# Patient Record
Sex: FEMALE | Race: BLACK OR AFRICAN AMERICAN | Employment: UNEMPLOYED | URBAN - METROPOLITAN AREA
[De-identification: names, ages, dates, MRNs, and addresses within clinical notes are randomized per-mention and may not be internally consistent; named-entity substitution may affect disease eponyms.]

---

## 2017-08-24 ENCOUNTER — HOSPITAL ENCOUNTER (INPATIENT)
Age: 27
LOS: 2 days | Discharge: HOME | DRG: 560 | End: 2017-08-26
Attending: OBSTETRICS & GYNECOLOGY | Admitting: OBSTETRICS & GYNECOLOGY
Payer: MEDICAID

## 2017-08-24 ENCOUNTER — ANESTHESIA EVENT (OUTPATIENT)
Dept: LABOR AND DELIVERY | Age: 27
DRG: 560 | End: 2017-08-24
Payer: MEDICAID

## 2017-08-24 ENCOUNTER — ANESTHESIA (OUTPATIENT)
Dept: LABOR AND DELIVERY | Age: 27
DRG: 560 | End: 2017-08-24
Payer: MEDICAID

## 2017-08-24 PROBLEM — Z3A.39 39 WEEKS GESTATION OF PREGNANCY: Status: RESOLVED | Noted: 2017-08-24 | Resolved: 2017-08-24

## 2017-08-24 PROBLEM — Z33.1 IUP (INTRAUTERINE PREGNANCY), INCIDENTAL: Status: RESOLVED | Noted: 2017-08-24 | Resolved: 2017-08-24

## 2017-08-24 PROBLEM — Z3A.39 39 WEEKS GESTATION OF PREGNANCY: Status: ACTIVE | Noted: 2017-08-24

## 2017-08-24 PROBLEM — Z33.1 IUP (INTRAUTERINE PREGNANCY), INCIDENTAL: Status: ACTIVE | Noted: 2017-08-24

## 2017-08-24 PROBLEM — O99.820 GBS (GROUP B STREPTOCOCCUS CARRIER), +RV CULTURE, CURRENTLY PREGNANT: Status: RESOLVED | Noted: 2017-08-24 | Resolved: 2017-08-24

## 2017-08-24 PROBLEM — O99.820 GBS (GROUP B STREPTOCOCCUS CARRIER), +RV CULTURE, CURRENTLY PREGNANT: Status: ACTIVE | Noted: 2017-08-24

## 2017-08-24 LAB
ABO + RH BLD: NORMAL
BASOPHILS # BLD: 0 K/UL (ref 0–0.06)
BASOPHILS NFR BLD: 0 % (ref 0–2)
BLOOD GROUP ANTIBODIES SERPL: NORMAL
DIFFERENTIAL METHOD BLD: ABNORMAL
EOSINOPHIL # BLD: 0.1 K/UL (ref 0–0.4)
EOSINOPHIL NFR BLD: 1 % (ref 0–5)
ERYTHROCYTE [DISTWIDTH] IN BLOOD BY AUTOMATED COUNT: 13.1 % (ref 11.6–14.5)
HCT VFR BLD AUTO: 34.5 % (ref 35–45)
HGB BLD-MCNC: 11.2 G/DL (ref 12–16)
LYMPHOCYTES # BLD: 1.8 K/UL (ref 0.9–3.6)
LYMPHOCYTES NFR BLD: 17 % (ref 21–52)
MCH RBC QN AUTO: 27.7 PG (ref 24–34)
MCHC RBC AUTO-ENTMCNC: 32.5 G/DL (ref 31–37)
MCV RBC AUTO: 85.4 FL (ref 74–97)
MONOCYTES # BLD: 0.9 K/UL (ref 0.05–1.2)
MONOCYTES NFR BLD: 9 % (ref 3–10)
NEUTS SEG # BLD: 8 K/UL (ref 1.8–8)
NEUTS SEG NFR BLD: 73 % (ref 40–73)
PLATELET # BLD AUTO: 195 K/UL (ref 135–420)
PMV BLD AUTO: 10.1 FL (ref 9.2–11.8)
RBC # BLD AUTO: 4.04 M/UL (ref 4.2–5.3)
SPECIMEN EXP DATE BLD: NORMAL
WBC # BLD AUTO: 10.8 K/UL (ref 4.6–13.2)

## 2017-08-24 PROCEDURE — 74011250636 HC RX REV CODE- 250/636

## 2017-08-24 PROCEDURE — 86900 BLOOD TYPING SEROLOGIC ABO: CPT | Performed by: OBSTETRICS & GYNECOLOGY

## 2017-08-24 PROCEDURE — 75410000003 HC RECOV DEL/VAG/CSECN EA 0.5 HR

## 2017-08-24 PROCEDURE — 85025 COMPLETE CBC W/AUTO DIFF WBC: CPT | Performed by: OBSTETRICS & GYNECOLOGY

## 2017-08-24 PROCEDURE — 74011250636 HC RX REV CODE- 250/636: Performed by: ANESTHESIOLOGY

## 2017-08-24 PROCEDURE — 00HU33Z INSERTION OF INFUSION DEVICE INTO SPINAL CANAL, PERCUTANEOUS APPROACH: ICD-10-PCS | Performed by: ANESTHESIOLOGY

## 2017-08-24 PROCEDURE — 76060000078 HC EPIDURAL ANESTHESIA

## 2017-08-24 PROCEDURE — 77030007879 HC KT SPN EPDRL TELE -B: Performed by: ANESTHESIOLOGY

## 2017-08-24 PROCEDURE — 3E033VJ INTRODUCTION OF OTHER HORMONE INTO PERIPHERAL VEIN, PERCUTANEOUS APPROACH: ICD-10-PCS | Performed by: OBSTETRICS & GYNECOLOGY

## 2017-08-24 PROCEDURE — 75410000000 HC DELIVERY VAGINAL/SINGLE

## 2017-08-24 PROCEDURE — 4A0HXCZ MEASUREMENT OF PRODUCTS OF CONCEPTION, CARDIAC RATE, EXTERNAL APPROACH: ICD-10-PCS | Performed by: OBSTETRICS & GYNECOLOGY

## 2017-08-24 PROCEDURE — 3E0R3CZ INTRODUCE REGIONAL ANESTH IN SPINAL CANAL, PERC: ICD-10-PCS | Performed by: ANESTHESIOLOGY

## 2017-08-24 PROCEDURE — 74011250637 HC RX REV CODE- 250/637: Performed by: OBSTETRICS & GYNECOLOGY

## 2017-08-24 PROCEDURE — 74011000250 HC RX REV CODE- 250

## 2017-08-24 PROCEDURE — 36415 COLL VENOUS BLD VENIPUNCTURE: CPT | Performed by: OBSTETRICS & GYNECOLOGY

## 2017-08-24 PROCEDURE — 65270000029 HC RM PRIVATE

## 2017-08-24 PROCEDURE — 74011250636 HC RX REV CODE- 250/636: Performed by: OBSTETRICS & GYNECOLOGY

## 2017-08-24 PROCEDURE — 74011000258 HC RX REV CODE- 258: Performed by: OBSTETRICS & GYNECOLOGY

## 2017-08-24 PROCEDURE — 75410000002 HC LABOR FEE PER 1 HR

## 2017-08-24 RX ORDER — NALOXONE HYDROCHLORIDE 0.4 MG/ML
0.2 INJECTION, SOLUTION INTRAMUSCULAR; INTRAVENOUS; SUBCUTANEOUS AS NEEDED
Status: DISCONTINUED | OUTPATIENT
Start: 2017-08-24 | End: 2017-08-25 | Stop reason: HOSPADM

## 2017-08-24 RX ORDER — SODIUM CHLORIDE 0.9 % (FLUSH) 0.9 %
5-10 SYRINGE (ML) INJECTION AS NEEDED
Status: DISCONTINUED | OUTPATIENT
Start: 2017-08-24 | End: 2017-08-25 | Stop reason: HOSPADM

## 2017-08-24 RX ORDER — LIDOCAINE HYDROCHLORIDE 10 MG/ML
20 INJECTION, SOLUTION EPIDURAL; INFILTRATION; INTRACAUDAL; PERINEURAL AS NEEDED
Status: DISCONTINUED | OUTPATIENT
Start: 2017-08-24 | End: 2017-08-25 | Stop reason: HOSPADM

## 2017-08-24 RX ORDER — FENTANYL CITRATE 50 UG/ML
INJECTION, SOLUTION INTRAMUSCULAR; INTRAVENOUS AS NEEDED
Status: DISCONTINUED | OUTPATIENT
Start: 2017-08-24 | End: 2017-08-24 | Stop reason: HOSPADM

## 2017-08-24 RX ORDER — METHYLERGONOVINE MALEATE 0.2 MG/ML
0.2 INJECTION INTRAVENOUS AS NEEDED
Status: DISCONTINUED | OUTPATIENT
Start: 2017-08-24 | End: 2017-08-25 | Stop reason: HOSPADM

## 2017-08-24 RX ORDER — HYDROMORPHONE HYDROCHLORIDE 1 MG/ML
1 INJECTION, SOLUTION INTRAMUSCULAR; INTRAVENOUS; SUBCUTANEOUS
Status: DISCONTINUED | OUTPATIENT
Start: 2017-08-24 | End: 2017-08-25 | Stop reason: HOSPADM

## 2017-08-24 RX ORDER — FENTANYL/ROPIVACAINE/NS/PF 2MCG/ML-.1
1-15 PLASTIC BAG, INJECTION (ML) EPIDURAL
Status: DISCONTINUED | OUTPATIENT
Start: 2017-08-24 | End: 2017-08-25 | Stop reason: HOSPADM

## 2017-08-24 RX ORDER — FENTANYL CITRATE 50 UG/ML
INJECTION, SOLUTION INTRAMUSCULAR; INTRAVENOUS
Status: DISPENSED
Start: 2017-08-24 | End: 2017-08-25

## 2017-08-24 RX ORDER — MINERAL OIL
30 OIL (ML) ORAL AS NEEDED
Status: DISCONTINUED | OUTPATIENT
Start: 2017-08-24 | End: 2017-08-25 | Stop reason: HOSPADM

## 2017-08-24 RX ORDER — SODIUM CHLORIDE, SODIUM LACTATE, POTASSIUM CHLORIDE, CALCIUM CHLORIDE 600; 310; 30; 20 MG/100ML; MG/100ML; MG/100ML; MG/100ML
125 INJECTION, SOLUTION INTRAVENOUS CONTINUOUS
Status: DISCONTINUED | OUTPATIENT
Start: 2017-08-24 | End: 2017-08-25 | Stop reason: HOSPADM

## 2017-08-24 RX ORDER — BUTORPHANOL TARTRATE 2 MG/ML
2 INJECTION INTRAMUSCULAR; INTRAVENOUS
Status: DISCONTINUED | OUTPATIENT
Start: 2017-08-24 | End: 2017-08-25 | Stop reason: HOSPADM

## 2017-08-24 RX ORDER — TERBUTALINE SULFATE 1 MG/ML
0.25 INJECTION SUBCUTANEOUS
Status: DISCONTINUED | OUTPATIENT
Start: 2017-08-24 | End: 2017-08-25 | Stop reason: HOSPADM

## 2017-08-24 RX ORDER — LANOLIN ALCOHOL/MO/W.PET/CERES
325 CREAM (GRAM) TOPICAL
COMMUNITY
End: 2019-08-26

## 2017-08-24 RX ORDER — ACETAMINOPHEN 325 MG/1
650 TABLET ORAL
Status: DISCONTINUED | OUTPATIENT
Start: 2017-08-24 | End: 2017-08-26 | Stop reason: HOSPADM

## 2017-08-24 RX ORDER — OXYTOCIN IN 5 % DEXTROSE 30/500 ML
.5-2 PLASTIC BAG, INJECTION (ML) INTRAVENOUS
Status: DISCONTINUED | OUTPATIENT
Start: 2017-08-24 | End: 2017-08-26 | Stop reason: HOSPADM

## 2017-08-24 RX ORDER — LANOLIN ALCOHOL/MO/W.PET/CERES
325 CREAM (GRAM) TOPICAL
Status: CANCELLED | OUTPATIENT
Start: 2017-08-25

## 2017-08-24 RX ORDER — FENTANYL/ROPIVACAINE/NS/PF 2MCG/ML-.1
PLASTIC BAG, INJECTION (ML) EPIDURAL
Status: COMPLETED
Start: 2017-08-24 | End: 2017-08-24

## 2017-08-24 RX ORDER — ROPIVACAINE HYDROCHLORIDE 2 MG/ML
INJECTION, SOLUTION EPIDURAL; INFILTRATION; PERINEURAL AS NEEDED
Status: DISCONTINUED | OUTPATIENT
Start: 2017-08-24 | End: 2017-08-24 | Stop reason: HOSPADM

## 2017-08-24 RX ORDER — IBUPROFEN 400 MG/1
800 TABLET ORAL
Status: DISCONTINUED | OUTPATIENT
Start: 2017-08-24 | End: 2017-08-26 | Stop reason: HOSPADM

## 2017-08-24 RX ORDER — ONDANSETRON 2 MG/ML
4 INJECTION INTRAMUSCULAR; INTRAVENOUS
Status: DISCONTINUED | OUTPATIENT
Start: 2017-08-24 | End: 2017-08-25 | Stop reason: HOSPADM

## 2017-08-24 RX ORDER — LIDOCAINE HYDROCHLORIDE AND EPINEPHRINE 15; 5 MG/ML; UG/ML
INJECTION, SOLUTION EPIDURAL AS NEEDED
Status: DISCONTINUED | OUTPATIENT
Start: 2017-08-24 | End: 2017-08-24 | Stop reason: HOSPADM

## 2017-08-24 RX ORDER — NALBUPHINE HYDROCHLORIDE 10 MG/ML
10 INJECTION, SOLUTION INTRAMUSCULAR; INTRAVENOUS; SUBCUTANEOUS
Status: DISCONTINUED | OUTPATIENT
Start: 2017-08-24 | End: 2017-08-25 | Stop reason: HOSPADM

## 2017-08-24 RX ORDER — SODIUM CHLORIDE 0.9 % (FLUSH) 0.9 %
5-10 SYRINGE (ML) INJECTION EVERY 8 HOURS
Status: DISCONTINUED | OUTPATIENT
Start: 2017-08-24 | End: 2017-08-25 | Stop reason: HOSPADM

## 2017-08-24 RX ORDER — OXYTOCIN/RINGER'S LACTATE 20/1000 ML
125 PLASTIC BAG, INJECTION (ML) INTRAVENOUS CONTINUOUS
Status: DISCONTINUED | OUTPATIENT
Start: 2017-08-24 | End: 2017-08-25 | Stop reason: HOSPADM

## 2017-08-24 RX ORDER — FENTANYL CITRATE 50 UG/ML
100 INJECTION, SOLUTION INTRAMUSCULAR; INTRAVENOUS ONCE
Status: DISCONTINUED | OUTPATIENT
Start: 2017-08-24 | End: 2017-08-25 | Stop reason: HOSPADM

## 2017-08-24 RX ORDER — ZOLPIDEM TARTRATE 5 MG/1
5 TABLET ORAL
Status: DISCONTINUED | OUTPATIENT
Start: 2017-08-24 | End: 2017-08-26 | Stop reason: HOSPADM

## 2017-08-24 RX ORDER — LIDOCAINE HYDROCHLORIDE 20 MG/ML
INJECTION, SOLUTION EPIDURAL; INFILTRATION; INTRACAUDAL; PERINEURAL AS NEEDED
Status: DISCONTINUED | OUTPATIENT
Start: 2017-08-24 | End: 2017-08-24 | Stop reason: HOSPADM

## 2017-08-24 RX ORDER — OXYTOCIN/RINGER'S LACTATE 20/1000 ML
500 PLASTIC BAG, INJECTION (ML) INTRAVENOUS ONCE
Status: DISPENSED | OUTPATIENT
Start: 2017-08-24 | End: 2017-08-24

## 2017-08-24 RX ORDER — OXYCODONE AND ACETAMINOPHEN 5; 325 MG/1; MG/1
2 TABLET ORAL
Status: DISCONTINUED | OUTPATIENT
Start: 2017-08-24 | End: 2017-08-26 | Stop reason: HOSPADM

## 2017-08-24 RX ADMIN — IBUPROFEN 800 MG: 400 TABLET, FILM COATED ORAL at 23:22

## 2017-08-24 RX ADMIN — SODIUM CHLORIDE, SODIUM LACTATE, POTASSIUM CHLORIDE, AND CALCIUM CHLORIDE 300 ML: 600; 310; 30; 20 INJECTION, SOLUTION INTRAVENOUS at 19:04

## 2017-08-24 RX ADMIN — Medication 6 MILLI-UNITS/MIN: at 11:56

## 2017-08-24 RX ADMIN — ROPIVACAINE HYDROCHLORIDE 7 ML: 2 INJECTION, SOLUTION EPIDURAL; INFILTRATION; PERINEURAL at 18:27

## 2017-08-24 RX ADMIN — CEFOXITIN SODIUM 1 G: 1 POWDER, FOR SOLUTION INTRAVENOUS at 11:36

## 2017-08-24 RX ADMIN — LIDOCAINE HYDROCHLORIDE 5 ML: 20 INJECTION, SOLUTION EPIDURAL; INFILTRATION; INTRACAUDAL; PERINEURAL at 17:11

## 2017-08-24 RX ADMIN — SODIUM CHLORIDE, SODIUM LACTATE, POTASSIUM CHLORIDE, AND CALCIUM CHLORIDE 125 ML/HR: 600; 310; 30; 20 INJECTION, SOLUTION INTRAVENOUS at 11:21

## 2017-08-24 RX ADMIN — BUTORPHANOL TARTRATE 2 MG: 2 INJECTION, SOLUTION INTRAMUSCULAR; INTRAVENOUS at 16:09

## 2017-08-24 RX ADMIN — CEFOXITIN SODIUM 1 G: 1 POWDER, FOR SOLUTION INTRAVENOUS at 15:42

## 2017-08-24 RX ADMIN — CEFOXITIN SODIUM 1 G: 1 POWDER, FOR SOLUTION INTRAVENOUS at 20:00

## 2017-08-24 RX ADMIN — LIDOCAINE HYDROCHLORIDE AND EPINEPHRINE 2 ML: 15; 5 INJECTION, SOLUTION EPIDURAL at 17:14

## 2017-08-24 RX ADMIN — FENTANYL CITRATE 100 MCG: 50 INJECTION, SOLUTION INTRAMUSCULAR; INTRAVENOUS at 17:14

## 2017-08-24 RX ADMIN — Medication 12 ML/HR: at 17:47

## 2017-08-24 RX ADMIN — Medication 125 ML/HR: at 20:36

## 2017-08-24 RX ADMIN — ONDANSETRON 4 MG: 2 INJECTION INTRAMUSCULAR; INTRAVENOUS at 13:47

## 2017-08-24 RX ADMIN — LIDOCAINE HYDROCHLORIDE AND EPINEPHRINE 3 ML: 15; 5 INJECTION, SOLUTION EPIDURAL at 17:13

## 2017-08-24 RX ADMIN — BUTORPHANOL TARTRATE 2 MG: 2 INJECTION, SOLUTION INTRAMUSCULAR; INTRAVENOUS at 13:54

## 2017-08-24 RX ADMIN — ACETAMINOPHEN 650 MG: 325 TABLET ORAL at 23:22

## 2017-08-24 NOTE — H&P
Ostetrical History and Physical    Subjective:     Date of Admission: 2017    Patient is a 28 y.o.   female admitted with term preg with SPROM, no good labor yet. GBS pos, allergic pen. For Obstetric history, see prenantal.    For Review of Systems, see prenatal    Past Medical History:   Diagnosis Date    Abnormal Papanicolaou smear of cervix     Anemia       No past surgical history on file. Prior to Admission medications    Medication Sig Start Date End Date Taking? Authorizing Provider   PNV No.40-Iron Fum-FA Cmb No.1 27-1 mg tab Take 1 Tab by mouth. Yes Historical Provider   ferrous sulfate (IRON) 325 mg (65 mg iron) tablet Take 325 mg by mouth Daily (before breakfast). Yes Historical Provider     Allergies   Allergen Reactions    Pcn [Penicillins] Hives      Social History   Substance Use Topics    Smoking status: Never Smoker    Smokeless tobacco: Never Used    Alcohol use No      No family history on file. Objective:     Blood pressure 130/77, pulse 82, temperature 98.7 °F (37.1 °C), resp. rate 18, height 5' 5\" (1.651 m), weight 63.5 kg (140 lb), not currently breastfeeding. Temp (24hrs), Av.7 °F (37.1 °C), Min:98.7 °F (37.1 °C), Max:98.7 °F (37.1 °C)                HEENT: No pallor, no jaundice, Thyroid and throat normal  RESPIRATORY: Clear to A & P  CVS: pulse reg, HS normal  ABDOMEN: Gravid. Vertex. EFW=7lb +-1lb. No abnormal tenderness. Pelvic: Cervix 1.5,   Effaced: 50%  Station:  -3  Data Review:   No results found for this or any previous visit (from the past 24 hour(s)).   Monitor:  Reactivity:present Variability:present Baseline:within normal limits    Assessment:     Principal Problem:    Premature labor with rupture of membranes in third trimester (2017)    Active Problems:    39 weeks gestation of pregnancy (2017)      GBS (group B Streptococcus carrier), +RV culture, currently pregnant (2017)      IUP (intrauterine pregnancy), incidental (8/24/2017)        Plan:   Mefoxin IV after test dose  Pit induction    Check labs:  CBC  Check  Prenatal:    Disposition:     Type of admit:In-Patient    I saw and examined patient.     Signed By: Lavern Richey MD                         August 24, 2017

## 2017-08-24 NOTE — IP AVS SNAPSHOT
[address] [address] 
[phone] Patient: Kamaljit Galindo MRN: [id number] AYN:4[birth year]39[birth year]31 You are allergic to the following Allergen Reactions Pcn (Penicillins) Hives Recent Documentation Height Weight Breastfeeding? BMI OB Status Smoking Status 1.651 m 63.5 kg Unknown 23.3 kg/m2 Recent pregnancy Never Smoker Emergency Contacts Name Discharge Info Relation Home Work Mobile Ekta Page DISCHARGE CAREGIVER [3] Mother [14] [phone] About your hospitalization You were admitted on:  August 24, 2017 You last received care in the:  [address] [place] [address] You were discharged on:  August 26, 2017 Unit phone number:  [phone] Why you were hospitalized Your primary diagnosis was:  Premature Labor With Rupture Of Membranes In Third Trimester Your diagnoses also included:  Iup (Intrauterine Pregnancy), Incidental, 39 Weeks Gestation Of Pregnancy, Gbs (Group B Streptococcus Carrier), +Rv Culture, Currently Pregnant, Normal Spontaneous Vaginal Delivery Providers Seen During Your Hospitalizations Provider Role Specialty Primary office phone Rajiv Villalpando MD Attending Provider Obstetrics & Gynecology [phone] Your Primary Care Physician (PCP) Primary Care Physician Office Phone Office Fax Joy Quinteros [phone] Follow-up Information Follow up With Details Comments Contact Info Shelly Salinas MD   [address] [facility] 150 
[phone] Rajiv Villalpando MD In 6 weeks  [address] 
[phone] Current Discharge Medication List  
  
START taking these medications Dose & Instructions Dispensing Information Comments Morning Noon Evening Bedtime  
 ibuprofen 800 mg tablet Commonly known as:  MOTRIN  
   
 Your last dose was: Your next dose is:    
   
   
 Dose:  800 mg Take 1 Tab by mouth every eight (8) hours as needed. Quantity:  30 Tab Refills:  0 CONTINUE these medications which have NOT CHANGED Dose & Instructions Dispensing Information Comments Morning Noon Evening Bedtime Iron 325 mg (65 mg iron) tablet Generic drug:  ferrous sulfate Your last dose was: Your next dose is:    
   
   
 Dose:  325 mg Take 325 mg by mouth Daily (before breakfast). Refills:  0  
     
   
   
   
  
 PNV No.40-Iron Fum-FA Cmb No.1 27-1 mg Tab Your last dose was: Your next dose is:    
   
   
 Dose:  1 Tab Take 1 Tab by mouth. Refills:  0 Where to Get Your Medications These medications were sent to [facility]iallo  [address] [facility] [address] [address] Phone:  [phone]  
  ibuprofen 800 mg tablet Discharge Instructions POST DELIVERY DISCHARGE INSTRUCTIONS Name: Pedro Cruz YOB: 1990 Primary Diagnosis: Active Problems: 
  Normal spontaneous vaginal delivery (8/24/2017) General:  
 
Diet/Diet Restrictions: 
Eight 8-ounce glasses of fluid daily (water, juices); avoid excessive caffeine intake. Meals/snacks as desired which are high in fiber and carbohydrates and low in fat and cholesterol. Physical Activity / Restrictions / Safety:  
 
Avoid heavy lifting, no more that 8 lbs. For 2-3 weeks. Avoid intercourse 4-6 weeks, no douching or tampon use. Check with obstetrician before starting or resuming an exercise program.    
 
 
Discharge Instructions/Special Treatment/Home Care Needs:  
 
Continue prenatal vitamins. Continue to use squirt bottle with warm water on your episiotomy after each bathroom use until bleeding stops. Call your doctor for the following: Fever over 100.4 degrees by mouth. Vaginal bleeding heavier than a normal menstrual period or clot larger than a golf ball. Red streaks or increased swelling of legs, painful red streaks on your breast. 
Painful urination, constipation and increased pain or swelling or discharge with your incision. If you feel extremely anxious or overwhelmed. If you have thoughts of harming yourself and/or your baby. Pain Management:  
 
Pain Management:  
Take Acetaminophen (Tylenol) or Ibuprofen (Advil, Motrin), as directed for pain. Use a warm Sitz bath 3 times daily to relieve episiotomy or hemorrhoidal discomfort. Heating pad to  incision as needed. For hemorrhoidal discomfort, use Tucks and Anusol cream as needed and directed. Follow-Up Care: These are general instructions for a healthy lifestyle: No smoking/ No tobacco products/ Avoid exposure to second hand smoke Surgeon General's Warning:  Quitting smoking now greatly reduces serious risk to your health. Obesity, smoking, and sedentary lifestyle greatly increases your risk for illness A healthy diet, regular physical exercise & weight monitoring are important for maintaining a healthy lifestyle Recognize signs and symptoms of STROKE: 
 
F-face looks uneven A-arms unable to move or move unevenly S-speech slurred or non-existent T-time-call 911 as soon as signs and symptoms begin-DO NOT go Back to bed or wait to see if you get better-TIME IS BRAIN. Signed By: Katerina Griggs LPN                                                                                                   Date: 2017 Time: 10:47 AM 
 
Patient {[id number]} Post-Birth Warning Signs:Postpartum Discharge Education Checklist given to patient. Discharge Orders Procedure Order Date Status Priority Quantity Spec Type Associated Dx BREAST PUMP 17 0714 Normal Routine 1 Questions: Reason for the pump:  lactation Diagnosis code:  Lactation Introducing [facility]! Renu Davis introduces iHydroRun patient portal. Now you can access parts of your medical record, email your doctor's office, and request medication refills online. 1. In your internet browser, go to [email or web address] 2. Click on the First Time User? Click Here link in the Sign In box. You will see the New Member Sign Up page. 3. Enter your iHydroRun Access Code exactly as it appears below. You will not need to use this code after youve completed the sign-up process. If you do not sign up before the expiration date, you must request a new code. · i[employer]Run Access Code: [email or web address] Expires: 11/24/2017 10:48 AM 
 
4. Enter the last four digits of your Social Security Number (xxxx) and Date of Birth (mm/dd/yyyy) as indicated and click Submit. You will be taken to the next sign-up page. 5. Create a iHydroRun ID. This will be your iHydroRun login ID and cannot be changed, so think of one that is secure and easy to remember. 6. Create a i[employer]Run password. You can change your password at any time. 7. Enter your Password Reset Question and Answer. This can be used at a later time if you forget your password. 8. Enter your e-mail address. You will receive e-mail notification when new information is available in [address] 9. Click Sign Up. You can now view and download portions of your medical record. 10. Click the Download Summary menu link to download a portable copy of your medical information. If you have questions, please visit the Frequently Asked Questions section of the i[employer]Run website. Remember, iHydroRun is NOT to be used for urgent needs. For medical emergencies, dial 911. Now available from your iPhone and Android! General Information Please provide this summary of care documentation to your next provider. Patient Signature:  ____________________________________________________________ Date:  ____________________________________________________________  
  
Darlin Oka Provider Signature:  ____________________________________________________________ Date:  ____________________________________________________________

## 2017-08-24 NOTE — ANESTHESIA PREPROCEDURE EVALUATION
Anesthetic History   No history of anesthetic complications            Review of Systems / Medical History  Patient summary reviewed, nursing notes reviewed and pertinent labs reviewed    Pulmonary  Within defined limits                 Neuro/Psych   Within defined limits           Cardiovascular  Within defined limits                Exercise tolerance: >4 METS     GI/Hepatic/Renal  Within defined limits              Endo/Other  Within defined limits           Other Findings              Physical Exam    Airway  Mallampati: II  TM Distance: 4 - 6 cm  Neck ROM: normal range of motion   Mouth opening: Normal     Cardiovascular    Rhythm: regular  Rate: normal         Dental  No notable dental hx       Pulmonary  Breath sounds clear to auscultation               Abdominal  GI exam deferred       Other Findings            Anesthetic Plan    ASA: 2  Anesthesia type: epidural                Risks and benefits of epidural include but not limited to a headache (with risk of repair requiring blood patch), backache, bleeding, infection, nerve injury, paralysis, failure with need to replace, aand hemodynamic changes.

## 2017-08-24 NOTE — ANESTHESIA PROCEDURE NOTES
Epidural Block    Start time: 8/24/2017 5:11 PM  End time: 8/24/2017 5:15 PM  Performed by: Glenn Hauser by: Mis Edmonds     Pre-Procedure  Indication: labor epidural    Preanesthetic Checklist: patient identified, risks and benefits discussed, anesthesia consent, site marked, patient being monitored, timeout performed and anesthesia consent    Timeout Time: 17:11        Epidural:   Patient position:  Seated  Prep region:  Lumbar  Prep: Chlorhexidine    Location:  L3-4    Needle and Epidural Catheter:   Needle Type:  Tuohy  Needle Gauge:  17 G  Injection Technique:  Loss of resistance using air  Attempts:  1  Catheter Size:  18 G  Catheter at Skin Depth (cm):  10  Depth in Epidural Space (cm):  5  Events: no blood with aspiration, no cerebrospinal fluid with aspiration, no paresthesia and negative aspiration test    Test Dose:  Lidocaine 1.0% w/ epi and negative    Assessment:   Catheter Secured:  Tape and tegaderm  Insertion:  Uncomplicated  Patient tolerance:  Patient tolerated the procedure well with no immediate complications  10 cc normal saline to open space and filter placed at end.

## 2017-08-24 NOTE — PROGRESS NOTES
1100 PT arrived to unit with C/O ruputured membranes, VS WNL, admitted to the floor, IV started and placed in a room will cont to monitor    1130 Mefoxin given to pt to treat GBS postitive, Allergy noted by MD Pattie Orantes, advised to administer slowly and check response to medication    1200 IV Pitocin started     1245 Cervical exam performed by LON Echevarria RN 3/70/0    1515 Cervical exam performed by LON Echevarria RN 3/100/0    1537 Pt on sitting on birthing ball     1620 PT stating she is having pain 10/10 and would like an epidural    1638 Paged MD Ferdinand Salgado in regards to epidural request, giving pt bolus fluids now     1645 MD Ferdinand Salgado returned page and states will come by in 15 min to place epidural    1653 Dr. Ferdinand Salgado @ bedside discussing epidural. Risk and benefits discussed with pt. Pt wished to proceed. Confirmation of signed consent form. Pt positioned at side of bed for procedure.    1712 Catheter placed. 1711 Timeout performed. 1713 Test dose administered.    1715 Loading dose administered. Fentanyl vial given to MD Ferdinand Salgado to be administered through epidural.  Pt lying back in bed. TOCO and US adjusted.    PCA pump started. 1849 Oxygen applied 10L Non rebreather, Pitocin Stopped baby having decels with contractions, will monitor pt off pitocin and contact MD if continues PT repositioned    1914 Bedside and Verbal shift change report given to MERNA Morocho (oncoming nurse) by Mundo Madsen RN (offgoing nurse). Report included the following information SBAR, Kardex, Procedure Summary, Intake/Output, MAR, Recent Results and Alarm Parameters .

## 2017-08-24 NOTE — ROUTINE PROCESS
1040:  Patient presented to unit with complaints of SROM. Patient taken to LTR3 and oriented to room and call bell.

## 2017-08-25 LAB
HCT VFR BLD AUTO: 31.2 % (ref 35–45)
HGB BLD-MCNC: 10.2 G/DL (ref 12–16)

## 2017-08-25 PROCEDURE — 85018 HEMOGLOBIN: CPT | Performed by: OBSTETRICS & GYNECOLOGY

## 2017-08-25 PROCEDURE — 85014 HEMATOCRIT: CPT | Performed by: OBSTETRICS & GYNECOLOGY

## 2017-08-25 PROCEDURE — 74011250637 HC RX REV CODE- 250/637: Performed by: OBSTETRICS & GYNECOLOGY

## 2017-08-25 PROCEDURE — 65270000029 HC RM PRIVATE

## 2017-08-25 PROCEDURE — 36415 COLL VENOUS BLD VENIPUNCTURE: CPT | Performed by: OBSTETRICS & GYNECOLOGY

## 2017-08-25 RX ORDER — PROMETHAZINE HYDROCHLORIDE 25 MG/ML
25 INJECTION, SOLUTION INTRAMUSCULAR; INTRAVENOUS
Status: DISCONTINUED | OUTPATIENT
Start: 2017-08-25 | End: 2017-08-26 | Stop reason: HOSPADM

## 2017-08-25 RX ORDER — AMOXICILLIN 250 MG
1 CAPSULE ORAL
Status: DISCONTINUED | OUTPATIENT
Start: 2017-08-25 | End: 2017-08-26 | Stop reason: HOSPADM

## 2017-08-25 RX ADMIN — ACETAMINOPHEN 650 MG: 325 TABLET ORAL at 11:35

## 2017-08-25 RX ADMIN — IBUPROFEN 800 MG: 400 TABLET, FILM COATED ORAL at 11:36

## 2017-08-25 NOTE — ROUTINE PROCESS
TRANSFER - IN REPORT:    5 Verbal report received from D. Casimiro Leyden, RN (name) on Kathya Lou  being received from L&D (unit) for routine progression of care      Report consisted of patients Situation, Background, Assessment and   Recommendations(SBAR). Information from the following report(s) SBAR, Kardex, Intake/Output, MAR and Recent Results was reviewed with the receiving nurse. Opportunity for questions and clarification was provided. Assessment completed upon patients arrival to unit and care assumed.

## 2017-08-25 NOTE — ROUTINE PROCESS
3114 Patient transferred to Postpartum unit from L&D following a .    0200 Patient oriented to room, bed in lowest position, call bell within reach. Patient assessed and VS taken. VSS. Plan of care discussed with patient. No additional needs at this time. 3704 Patient ambulated to bathroom and voided 900 mL clear yellow urine into hat. No distress noted. Pad changed.

## 2017-08-25 NOTE — PROGRESS NOTES
Progress Note    Patient: Carmen Gupta MRN: [id number]  SSN: [id number]    YOB: 1990  Age: 28 y.o. Sex: female    ROOM:  [facility]/01      Subjective:     Postpartum Day: 1            Delivery: vaginal delivery    The patient feels well. The patient denies emotional concerns. The baby is well. Baby is feeding via both breast and bottle . The patient is ambulating well. The patient  tolerating a normal diet. Flatus has been passed.     Objective:      Patient Vitals for the past 24 hrs:   BP Temp Pulse Resp SpO2 Height Weight   08/25/17 0643 107/65 98 °F (36.7 °C) 72 16 98 % - -   08/25/17 0219 105/55 97.9 °F (36.6 °C) 80 16 97 % - -   08/24/17 2316 129/65 - (!) 103 - - - -   08/24/17 2300 128/74 - (!) 102 - - - -   08/24/17 2244 124/70 - 97 - - - -   08/24/17 2230 109/89 - (!) 105 - - - -   08/24/17 2215 130/69 - 89 - - - -   08/24/17 2200 134/77 - 97 16 - - -   08/24/17 2145 128/73 - 92 - - - -   08/24/17 2130 130/74 - 97 - - - -   08/24/17 2115 133/74 - 99 - - - -   08/24/17 2100 125/68 - (!) 102 - - - -   08/24/17 2053 125/72 - (!) 102 - - - -   08/24/17 2045 106/88 99 °F (37.2 °C) (!) 110 16 - - -   08/24/17 2030 139/72 - (!) 106 - - - -   08/24/17 2025 - - - - 100 % - -   08/24/17 2020 - - - - 100 % - -   08/24/17 2015 112/78 - (!) 107 - 99 % - -   08/24/17 2010 - - - - 100 % - -   08/24/17 2005 - - - - 100 % - -   08/24/17 2001 118/63 - (!) 133 - - - -   08/24/17 2000 - - - - (!) 80 % - -   08/24/17 1952 - - - - 100 % - -   08/24/17 1947 - - - - 100 % - -   08/24/17 1944 117/74 - (!) 126 - - - -   08/24/17 1942 - - - - 100 % - -   08/24/17 1937 - - - - 100 % - -   08/24/17 1932 - - - - 100 % - -   08/24/17 1930 94/62 - (!) 140 - - - -   08/24/17 1927 - - - - 100 % - -   08/24/17 1922 - - - - 99 % - -   08/24/17 1917 112/65 - (!) 115 - 100 % - -   08/24/17 1915 130/89 - 100 - - - -   08/24/17 1912 - - - - 100 % - -   08/24/17 1907 - - - - 100 % - -   08/24/17 1902 - - - - 100 % - -   08/24/17 1900 132/77 - (!) 107 - - - -   08/24/17 1853 - 98.2 °F (36.8 °C) - - - - -   08/24/17 1852 - - - - 100 % - -   08/24/17 1847 - - - - 100 % - -   08/24/17 1845 123/74 - (!) 115 - - - -   08/24/17 1842 - - - - 100 % - -   08/24/17 1837 - - - - 100 % - -   08/24/17 1832 - - - - 100 % - -   08/24/17 1830 131/83 - 94 - - - -   08/24/17 1827 - - - - 98 % - -   08/24/17 1822 - - - - 98 % - -   08/24/17 1817 - - - - 100 % - -   08/24/17 1815 125/83 - (!) 102 - - - -   08/24/17 1812 - - - - 100 % - -   08/24/17 1807 - - - - 98 % - -   08/24/17 1802 - - - - 99 % - -   08/24/17 1800 135/90 - 86 - - - -   08/24/17 1757 - - - - 100 % - -   08/24/17 1754 130/82 - 81 - - - -   08/24/17 1752 - - - - 98 % - -   08/24/17 1747 - - - - 98 % - -   08/24/17 1742 - - - - 94 % - -   08/24/17 1737 - - - - 98 % - -   08/24/17 1735 141/76 - 75 - - - -   08/24/17 1733 141/76 - 75 - - - -   08/24/17 1732 - - - - 100 % - -   08/24/17 1730 137/74 - 74 - - - -   08/24/17 1727 101/72 - (!) 104 - 98 % - -   08/24/17 1724 138/74 - 80 - - - -   08/24/17 1722 - - - - 100 % - -   08/24/17 1721 (!) 118/98 - (!) 101 - - - -   08/24/17 1719 - - 90 - 100 % - -   08/24/17 1718 123/79 - - - - - -   08/24/17 1715 141/90 - (!) 102 - - - -   08/24/17 1714 - - - - 95 % - -   08/24/17 1710 144/87 - (!) 103 - - - -   08/24/17 1700 131/71 - 89 - - - -   08/24/17 1649 145/73 98.4 °F (36.9 °C) 90 18 - - -   08/24/17 1600 (!) 133/93 - 96 - - - -   08/24/17 1530 138/88 - 87 - - - -   08/24/17 1508 132/77 98.2 °F (36.8 °C) 85 18 - - -   08/24/17 1500 132/77 - 85 - - - -   08/24/17 1430 128/69 - 80 - - - -   08/24/17 1400 155/84 - 99 - - - -   08/24/17 1330 122/82 - 81 - - - -   08/24/17 1303 133/73 - 85 - - - -   08/24/17 1233 112/64 - 83 - - - -   08/24/17 1202 120/82 - 89 - - - -   08/24/17 1101 137/80 - 89 - - - -   08/24/17 1054 130/77 98.7 °F (37.1 °C) 82 18 - - -   08/24/17 1053 130/77 - 92 - - - -   08/24/17 1052 - - - - - 5' 5\" (1.651 m) 63.5 kg (140 lb)     Lochia: appropriate   Uterine Fundus:   firm   Fundus Location:  -1   Incision:     DVT Evaluation:  No evidence of DVT seen on physical exam.  Negative Kapil's sign. No cords or calf tenderness. No significant calf/ankle edema. Lab/Data Review: All lab results for the last 24 hours reviewed. LABS: Recent Results (from the past 48 hour(s))   CBC WITH AUTOMATED DIFF    Collection Time: 08/24/17 11:30 AM   Result Value Ref Range    WBC 10.8 4.6 - 13.2 K/uL    RBC 4.04 (L) 4.20 - 5.30 M/uL    HGB 11.2 (L) 12.0 - 16.0 g/dL    HCT 34.5 (L) 35.0 - 45.0 %    MCV 85.4 74.0 - 97.0 FL    MCH 27.7 24.0 - 34.0 PG    MCHC 32.5 31.0 - 37.0 g/dL    RDW 13.1 11.6 - 14.5 %    PLATELET 711 833 - 033 K/uL    MPV 10.1 9.2 - 11.8 FL    NEUTROPHILS 73 40 - 73 %    LYMPHOCYTES 17 (L) 21 - 52 %    MONOCYTES 9 3 - 10 %    EOSINOPHILS 1 0 - 5 %    BASOPHILS 0 0 - 2 %    ABS. NEUTROPHILS 8.0 1.8 - 8.0 K/UL    ABS. LYMPHOCYTES 1.8 0.9 - 3.6 K/UL    ABS. MONOCYTES 0.9 0.05 - 1.2 K/UL    ABS. EOSINOPHILS 0.1 0.0 - 0.4 K/UL    ABS. BASOPHILS 0.0 0.0 - 0.06 K/UL    DF AUTOMATED     TYPE & SCREEN    Collection Time: 08/24/17 11:30 AM   Result Value Ref Range    Crossmatch Expiration 08/27/2017     ABO/Rh(D) B POSITIVE     Antibody screen NEG         Assessment:     Status post: Doing well postpartum vaginal delivery     Plan:     Postpartum care discussed including diet, ambulation, and actvitiy restrictions. Discharge instructions and questions answered.        Signed By: Tammy Sanchez CNM     August 25, 2017

## 2017-08-25 NOTE — ADT AUTH CERT NOTES
Patient Demographics        Patient Name [address] Sex  Address Phone       Rere Harris [phone] Female 1990 [address] [facility] [phone] (Home)           CSN:       [id number]           Admit Date: Admit Time Room Bed       Aug 24, 2017 10:41  [[id number]] 01 [[id number]]           Attending Providers        Provider Pager From To       Carolyne Mcgowan MD  17              Delivery Date: 2017   Delivery Time: 8:35 PM   Delivery Type: Vaginal, Spontaneous Delivery  Sex:  male    Gestational Age: 36w3d     [place] Measurements:  Birth Weight: 2.805 kg    Birth Length: 19.69\"          Delivery Clinician:     Living?:   Delivery Location: L&D

## 2017-08-25 NOTE — ROUTINE PROCESS
Bedside and Verbal shift change report given to Zuleima Horowitz RN (oncoming nurse) by LON Zelaya RN (offgoing nurse). Report included the following information SBAR, Kardex, Intake/Output, MAR and Recent Results.

## 2017-08-25 NOTE — L&D DELIVERY NOTE
Delivery Summary    Patient: Sona Franklin MRN: [id number]  SSN: [id number]    YOB: 1990  Age: 28 y.o. Sex: female        Labor Events:    Labor: No    Rupture Date: 2017    Rupture Time: 2:00 AM    Rupture Type SROM    Amniotic Fluid Volume:      Amniotic Fluid Description: Clear       Induction: Oxytocin        Augmentation: None    Labor Complications: None     Additional Complications:        Cervical Ripening:       None      Delivery Events:  Episiotomy: None    Laceration(s): None      Repaired: None     Number of Repair Packets: 0    Suture Type and Size:         Estimated Blood Loss (ml): 300        Information for the patient's :  Missael Coleman [[id number]]     Delivery Summary - Baby    Delivery Date: 2017   Delivery Time: 8:35 PM   Delivery Type: Vaginal, Spontaneous Delivery  Sex:  male  Gestational Age: 36w3d  Delivery Clinician:     Living?: Living   Delivery Location: L&D             APGARS  One minute Five minutes Ten minutes   Skin Color: 0    1       Heart Rate: 1   2         Reflex Irritability: 0   1         Muscle Tone: 2   2       Respiration: 1   1         Total: 4   7           Presentation: Vertex  Position: Left Occiput Anterior  Resuscitation Method:  Suctioning-bulb; Tactile Stimulation;PPV;Suctioning-tracheal     Meconium Stained: None    Cord Information: 3 Vessels   Complications: None  Cord Blood Sent?:  No    Blood Gases Sent?:  No    Placenta:  Date/Time:   8:39 PM  Removal: Spontaneous      Appearance: Normal     [place] Measurements:  Birth Weight: 6 lb 2.9 oz (2.805 kg)    Birth Length: 1' 7.69\" (0.5 m)   Head Circumference: 1' 1.19\" (0.335 m)     Chest Circumference: 1' 0.2\" (0.31 m)    Abdominal Girth: 11.81\" (0.3 m)    Other Providers:   Ismael Cranker, DIAMOND C.;ERICK VARGAS;CHEY CONTRERAS Obstetrician;Primary Nurse;Primary  Nurse;Neonatologist           Cord Blood Results:  Information for the patient's :  Phoenix Wadsworth [[id number]]   No results found for: Mace Sewell, PCTDIG, BILI, ABORHEXT, [address]    Information for the patient's :  Phoenix Burgosmann [[id number]]   No results found for: APH, APCO2, APO2, AHCO3, ABEC, ABDC, O2ST, SITE, RSCOM, PHI, Jodie, PO2I, HCO3I, SO2I, IBD     Information for the patient's :  Phoenix Floresufmann [[id number]]   No results found for: EPHV, PCO2V, PO2V, HCO3V, O2STV, EBDV

## 2017-08-25 NOTE — PROGRESS NOTES
: Verbal SBAR received from Clari Nicholson RN    2020: Dr. Sammie Krueger at bedside for delivery. : Bed broken down. Legs placed in stirrups. :Pt beginning to push. :  of viable infant male over intact perineum    :  of intact placenta    0: Epidural removed cath tip intact    : Pt ambulated to bathroom with assist x1. Pt unable to void at this time. Ashley care education provided. Clean pad applied. Pt ambulated back to bed without difficulties    0120:   Pt ambulated to bathroom and voided 250ml. Pt transferred to mother baby for routine progression of care. Verbal SBAR given to . Relinquished care of pt at this time.

## 2017-08-25 NOTE — ANESTHESIA POSTPROCEDURE EVALUATION
Post-Anesthesia Evaluation and Assessment    Patient: Jenni Diggs MRN: [id number]  SSN: [id number]    YOB: 1990  Age: 28 y.o. Sex: female         8/25/2017  2:36 PM    Laboring Epidural Follow-up Note     Referring physician: Myrene Mortimer, MD   Patient status post vaginal delivery with labor epidural    Visit Vitals    /65 (BP 1 Location: Right arm, BP Patient Position: At rest)    Pulse 72    Temp 36.7 °C (98 °F)    Resp 16    Ht 5' 5\" (1.651 m)    Wt 63.5 kg (140 lb)    SpO2 98%    Breastfeeding Unknown    BMI 23.3 kg/m2       Epidural removed by L&D staff  No sedation, pruritis noted. Adequate analgesia.   No obvious anesthesia complications          Cielo Verma CRNA    Signed By: Cielo Verma CRNA     August 25, 2017

## 2017-08-26 VITALS
WEIGHT: 140 LBS | TEMPERATURE: 98.3 F | SYSTOLIC BLOOD PRESSURE: 121 MMHG | DIASTOLIC BLOOD PRESSURE: 71 MMHG | BODY MASS INDEX: 23.32 KG/M2 | HEART RATE: 81 BPM | HEIGHT: 65 IN | RESPIRATION RATE: 18 BRPM | OXYGEN SATURATION: 98 %

## 2017-08-26 PROCEDURE — 74011250637 HC RX REV CODE- 250/637: Performed by: OBSTETRICS & GYNECOLOGY

## 2017-08-26 RX ORDER — IBUPROFEN 800 MG/1
800 TABLET ORAL
Qty: 30 TAB | Refills: 0 | Status: SHIPPED | OUTPATIENT
Start: 2017-08-26 | End: 2019-08-26

## 2017-08-26 RX ADMIN — IBUPROFEN 800 MG: 400 TABLET, FILM COATED ORAL at 08:29

## 2017-08-26 RX ADMIN — ACETAMINOPHEN 650 MG: 325 TABLET ORAL at 08:29

## 2017-08-26 NOTE — DISCHARGE INSTRUCTIONS
POST DELIVERY DISCHARGE INSTRUCTIONS    Name: Jenni Diggs  YOB: 1990  Primary Diagnosis: Active Problems:    Normal spontaneous vaginal delivery (2017)        General:     Diet/Diet Restrictions:  Eight 8-ounce glasses of fluid daily (water, juices); avoid excessive caffeine intake. Meals/snacks as desired which are high in fiber and carbohydrates and low in fat and cholesterol. Physical Activity / Restrictions / Safety:     Avoid heavy lifting, no more that 8 lbs. For 2-3 weeks. Avoid intercourse 4-6 weeks, no douching or tampon use. Check with obstetrician before starting or resuming an exercise program.         Discharge Instructions/Special Treatment/Home Care Needs:     Continue prenatal vitamins. Continue to use squirt bottle with warm water on your episiotomy after each bathroom use until bleeding stops. Call your doctor for the following:     Fever over 100.4 degrees by mouth. Vaginal bleeding heavier than a normal menstrual period or clot larger than a golf ball. Red streaks or increased swelling of legs, painful red streaks on your breast.  Painful urination, constipation and increased pain or swelling or discharge with your incision. If you feel extremely anxious or overwhelmed. If you have thoughts of harming yourself and/or your baby. Pain Management:     Pain Management:   Take Acetaminophen (Tylenol) or Ibuprofen (Advil, Motrin), as directed for pain. Use a warm Sitz bath 3 times daily to relieve episiotomy or hemorrhoidal discomfort. Heating pad to  incision as needed. For hemorrhoidal discomfort, use Tucks and Anusol cream as needed and directed. Follow-Up Care: These are general instructions for a healthy lifestyle:    No smoking/ No tobacco products/ Avoid exposure to second hand smoke    Surgeon General's Warning:  Quitting smoking now greatly reduces serious risk to your health.     Obesity, smoking, and sedentary lifestyle greatly increases your risk for illness    A healthy diet, regular physical exercise & weight monitoring are important for maintaining a healthy lifestyle    Recognize signs and symptoms of STROKE:    F-face looks uneven    A-arms unable to move or move unevenly    S-speech slurred or non-existent    T-time-call 911 as soon as signs and symptoms begin-DO NOT go       Back to bed or wait to see if you get better-TIME IS BRAIN. Signed By: Clifton Hauser LPN                                                                                                   Date: 8/26/2017 Time: 10:47 AM    Patient armband removed and shredded    Post-Birth Warning Signs:Postpartum Discharge Education Checklist given to patient.

## 2017-08-26 NOTE — PROGRESS NOTES
Progress Note    Patient: Hamzah Tesfaye MRN: [id number]  SSN: [id number]    YOB: 1990  Age: 28 y.o. Sex: female    ROOM:  251/01      Subjective:     Postpartum Day: 2            Delivery: vaginal delivery    The patient feels well. The patient denies emotional concerns. The baby is well. Baby is feeding via both breast and bottle . The patient is ambulating well. The patient  tolerating a normal diet. Flatus has been passed. Objective:      Patient Vitals for the past 24 hrs:   BP Temp Pulse Resp SpO2   08/25/17 2325 115/60 97.5 °F (36.4 °C) 84 16 97 %     Lochia:  appropriate   Uterine Fundus:   firm   Fundus Location:  -2   Incision:    DVT Evaluation:  No evidence of DVT seen on physical exam.  Negative Kapil's sign. No cords or calf tenderness. No significant calf/ankle edema. Lab/Data Review: All lab results for the last 24 hours reviewed. LABS: Recent Results (from the past 48 hour(s))   CBC WITH AUTOMATED DIFF    Collection Time: 08/24/17 11:30 AM   Result Value Ref Range    WBC 10.8 4.6 - 13.2 K/uL    RBC 4.04 (L) 4.20 - 5.30 M/uL    HGB 11.2 (L) 12.0 - 16.0 g/dL    HCT 34.5 (L) 35.0 - 45.0 %    MCV 85.4 74.0 - 97.0 FL    MCH 27.7 24.0 - 34.0 PG    MCHC 32.5 31.0 - 37.0 g/dL    RDW 13.1 11.6 - 14.5 %    PLATELET 804 002 - 291 K/uL    MPV 10.1 9.2 - 11.8 FL    NEUTROPHILS 73 40 - 73 %    LYMPHOCYTES 17 (L) 21 - 52 %    MONOCYTES 9 3 - 10 %    EOSINOPHILS 1 0 - 5 %    BASOPHILS 0 0 - 2 %    ABS. NEUTROPHILS 8.0 1.8 - 8.0 K/UL    ABS. LYMPHOCYTES 1.8 0.9 - 3.6 K/UL    ABS. MONOCYTES 0.9 0.05 - 1.2 K/UL    ABS. EOSINOPHILS 0.1 0.0 - 0.4 K/UL    ABS.  BASOPHILS 0.0 0.0 - 0.06 K/UL    DF AUTOMATED     TYPE & SCREEN    Collection Time: 08/24/17 11:30 AM   Result Value Ref Range    Crossmatch Expiration 08/27/2017     ABO/Rh(D) B POSITIVE     Antibody screen NEG    HEMATOCRIT    Collection Time: 08/25/17  7:56 AM   Result Value Ref Range    HCT 31.2 (L) 35.0 - 45.0 %   HEMOGLOBIN Collection Time: 08/25/17  7:56 AM   Result Value Ref Range    HGB 10.2 (L) 12.0 - 16.0 g/dL        Assessment:     Status post: Doing well postpartum vaginal delivery     Plan:     Postpartum care discussed including diet, ambulation, and actvitiy restrictions. Discharge instructions and questions answered.        Signed By: Page Allison CNM     August 26, 2017

## 2017-08-26 NOTE — LACTATION NOTE
Per [facility] staff, mom does not want to see lactation consultant. Doing bottles only.

## 2017-08-26 NOTE — ROUTINE PROCESS
Bedside and Verbal shift change report given to NAHUM De La Cruz (oncoming nurse) by LON Walton RN (offgoing nurse). Report included the following information SBAR, Kardex, Intake/Output, MAR and Recent Results.

## 2017-08-26 NOTE — ROUTINE PROCESS
Bedside and Verbal shift change report given to LON Giron RN  by Jaylin Andujar RN . Report given with Franklyn ROGER and MAR.

## 2019-08-26 PROBLEM — N20.0 RENAL CALCULI: Status: ACTIVE | Noted: 2019-08-26

## 2022-03-19 PROBLEM — N20.0 RENAL CALCULI: Status: ACTIVE | Noted: 2019-08-26

## 2025-03-17 ENCOUNTER — HOSPITAL ENCOUNTER (EMERGENCY)
Facility: HOSPITAL | Age: 35
Discharge: HOME | End: 2025-03-17
Payer: COMMERCIAL

## 2025-03-17 VITALS
WEIGHT: 189 LBS | OXYGEN SATURATION: 100 % | HEIGHT: 65 IN | SYSTOLIC BLOOD PRESSURE: 106 MMHG | TEMPERATURE: 98.1 F | RESPIRATION RATE: 15 BRPM | DIASTOLIC BLOOD PRESSURE: 84 MMHG | HEART RATE: 96 BPM | BODY MASS INDEX: 31.49 KG/M2

## 2025-03-17 DIAGNOSIS — R68.89 FLU-LIKE SYMPTOMS: Primary | ICD-10-CM

## 2025-03-17 LAB
FLUAV RNA SPEC QL NAA+PROBE: NOT DETECTED
FLUBV RNA SPEC QL NAA+PROBE: NOT DETECTED
SARS-COV-2 RNA RESP QL NAA+PROBE: NOT DETECTED
SOURCE: NORMAL

## 2025-03-17 PROCEDURE — 93005 ELECTROCARDIOGRAM TRACING: CPT | Performed by: EMERGENCY MEDICINE

## 2025-03-17 PROCEDURE — 99284 EMERGENCY DEPT VISIT MOD MDM: CPT

## 2025-03-17 PROCEDURE — 87636 SARSCOV2 & INF A&B AMP PRB: CPT

## 2025-03-17 RX ORDER — ONDANSETRON 4 MG/1
4 TABLET, ORALLY DISINTEGRATING ORAL 3 TIMES DAILY PRN
Qty: 21 TABLET | Refills: 0 | Status: SHIPPED | OUTPATIENT
Start: 2025-03-17

## 2025-03-17 RX ORDER — DICYCLOMINE HCL 20 MG
20 TABLET ORAL 4 TIMES DAILY PRN
Qty: 28 TABLET | Refills: 0 | Status: SHIPPED | OUTPATIENT
Start: 2025-03-17 | End: 2025-03-24

## 2025-03-17 ASSESSMENT — PAIN SCALES - GENERAL: PAINLEVEL_OUTOF10: 6

## 2025-03-17 ASSESSMENT — PAIN DESCRIPTION - LOCATION: LOCATION: CHEST

## 2025-03-17 ASSESSMENT — PAIN - FUNCTIONAL ASSESSMENT: PAIN_FUNCTIONAL_ASSESSMENT: 0-10

## 2025-03-17 NOTE — ED TRIAGE NOTES
Pt arrives ambulatory to triage c/o generalized body aches, chest discomfort, fever and diarrhea. Pt states it started last night. Pt states she was just here with her son who tested negative for everything. Pt states she has been taking Tylenol and Zofran at home to help with symptoms.

## 2025-03-18 LAB
EKG ATRIAL RATE: 110 BPM
EKG DIAGNOSIS: NORMAL
EKG P AXIS: 72 DEGREES
EKG P-R INTERVAL: 158 MS
EKG Q-T INTERVAL: 336 MS
EKG QRS DURATION: 78 MS
EKG QTC CALCULATION (BAZETT): 454 MS
EKG R AXIS: 69 DEGREES
EKG T AXIS: 10 DEGREES
EKG VENTRICULAR RATE: 110 BPM

## 2025-03-18 PROCEDURE — 93010 ELECTROCARDIOGRAM REPORT: CPT | Performed by: INTERNAL MEDICINE

## 2025-03-18 NOTE — ED PROVIDER NOTES
[facility]  EMERGENCY DEPARTMENT ENCOUNTER       Pt Name: Ashley Duke  MRN: [id number]  Birthdate 1990  Date of evaluation: 3/17/2025  PCP: Marcos Bustamante MD  Note Started: 8:25 PM 3/17/25     CHIEF COMPLAINT       Chief Complaint   Patient presents with    Fever    Chest Pain    Diarrhea    Generalized Body Aches        HISTORY OF PRESENT ILLNESS: 1 or more elements      History From: Patient  HPI Limitations: None  Chronic Conditions: Anemia  Social Determinants affecting Dx or Tx: none       Ashley Duke is a 35 y.o. female who presents to ED c/o flulike symptoms that began last night.  Patient reporting fever, chills, generalized bodyaches, nausea and diarrhea.  Patient states her son recently had a similar illness and was \"negative for everything.\"  Triage note states chest discomfort, patient denies chest pain or shortness of breath on my ROS, states she has some mild reflux symptoms intermittently with the nausea.  No cough.  Occasional abdominal cramping.  No recent travel.     Nursing Notes were all reviewed and agreed with or any disagreements were addressed in the HPI.    PAST HISTORY     Past Medical History:  Past Medical History:   Diagnosis Date    Abnormal Papanicolaou smear of cervix     Anemia     Renal calculi 8/26/2019       Past Surgical History:  History reviewed. No pertinent surgical history.    Family History:  History reviewed. No pertinent family history.    Social History:  Social History     Socioeconomic History    Marital status: Single     Spouse name: None    Number of children: None    Years of education: None    Highest education level: None   Tobacco Use    Smoking status: Never    Smokeless tobacco: Never   Substance and Sexual Activity    Alcohol use: No    Drug use: No     Social Drivers of Health     Intimate Partner Violence: Unknown (5/13/2024)    Received from [facility]    Humiliation, Afraid, Rape, and Kick questionnaire

## 2025-05-24 ENCOUNTER — HOSPITAL ENCOUNTER (EMERGENCY)
Facility: HOSPITAL | Age: 35
Discharge: HOME | End: 2025-05-24
Payer: COMMERCIAL

## 2025-05-24 ENCOUNTER — APPOINTMENT (OUTPATIENT)
Facility: HOSPITAL | Age: 35
End: 2025-05-24
Payer: COMMERCIAL

## 2025-05-24 VITALS
SYSTOLIC BLOOD PRESSURE: 127 MMHG | BODY MASS INDEX: 31.32 KG/M2 | HEART RATE: 84 BPM | DIASTOLIC BLOOD PRESSURE: 83 MMHG | WEIGHT: 188 LBS | TEMPERATURE: 97.2 F | OXYGEN SATURATION: 100 % | HEIGHT: 65 IN | RESPIRATION RATE: 16 BRPM

## 2025-05-24 DIAGNOSIS — N76.0 BV (BACTERIAL VAGINOSIS): ICD-10-CM

## 2025-05-24 DIAGNOSIS — N30.01 ACUTE CYSTITIS WITH HEMATURIA: Primary | ICD-10-CM

## 2025-05-24 DIAGNOSIS — B96.89 BV (BACTERIAL VAGINOSIS): ICD-10-CM

## 2025-05-24 DIAGNOSIS — S30.0XXA COCCYX CONTUSION, INITIAL ENCOUNTER: ICD-10-CM

## 2025-05-24 LAB
APPEARANCE UR: ABNORMAL
BACTERIA URNS QL MICRO: ABNORMAL /HPF
BILIRUB UR QL: NEGATIVE
COLOR UR: YELLOW
EPITH CASTS URNS QL MICRO: ABNORMAL /LPF (ref 0–5)
GLUCOSE UR STRIP.AUTO-MCNC: NEGATIVE MG/DL
HCG UR QL: NEGATIVE
HGB UR QL STRIP: ABNORMAL
KETONES UR QL STRIP.AUTO: ABNORMAL MG/DL
LEUKOCYTE ESTERASE UR QL STRIP.AUTO: ABNORMAL
MUCOUS THREADS URNS QL MICRO: ABNORMAL /LPF
NITRITE UR QL STRIP.AUTO: NEGATIVE
PH UR STRIP: 7 (ref 5–8)
PROT UR STRIP-MCNC: 30 MG/DL
RBC #/AREA URNS HPF: ABNORMAL /HPF (ref 0–5)
SERVICE CMNT-IMP: NORMAL
SP GR UR REFRACTOMETRY: 1.03 (ref 1–1.03)
UROBILINOGEN UR QL STRIP.AUTO: 1 EU/DL (ref 0.2–1)
WBC URNS QL MICRO: ABNORMAL /HPF (ref 0–5)
WET PREP GENITAL: NORMAL
WET PREP GENITAL: NORMAL

## 2025-05-24 PROCEDURE — 87210 SMEAR WET MOUNT SALINE/INK: CPT

## 2025-05-24 PROCEDURE — 87491 CHLMYD TRACH DNA AMP PROBE: CPT

## 2025-05-24 PROCEDURE — 87591 N.GONORRHOEAE DNA AMP PROB: CPT

## 2025-05-24 PROCEDURE — 99284 EMERGENCY DEPT VISIT MOD MDM: CPT

## 2025-05-24 PROCEDURE — 72220 X-RAY EXAM SACRUM TAILBONE: CPT

## 2025-05-24 PROCEDURE — 81025 URINE PREGNANCY TEST: CPT

## 2025-05-24 PROCEDURE — 81001 URINALYSIS AUTO W/SCOPE: CPT

## 2025-05-24 RX ORDER — FLUCONAZOLE 150 MG/1
150 TABLET ORAL ONCE
Qty: 1 TABLET | Refills: 0 | Status: SHIPPED | OUTPATIENT
Start: 2025-05-24 | End: 2025-05-24

## 2025-05-24 RX ORDER — NITROFURANTOIN 25; 75 MG/1; MG/1
100 CAPSULE ORAL 2 TIMES DAILY
Qty: 14 CAPSULE | Refills: 0 | Status: SHIPPED | OUTPATIENT
Start: 2025-05-24 | End: 2025-05-31

## 2025-05-24 RX ORDER — METRONIDAZOLE 500 MG/1
500 TABLET ORAL 2 TIMES DAILY
Qty: 14 TABLET | Refills: 0 | Status: SHIPPED | OUTPATIENT
Start: 2025-05-24 | End: 2025-05-31

## 2025-05-24 ASSESSMENT — PAIN DESCRIPTION - ORIENTATION: ORIENTATION: MID

## 2025-05-24 ASSESSMENT — PAIN DESCRIPTION - LOCATION: LOCATION: BUTTOCKS

## 2025-05-24 ASSESSMENT — PAIN - FUNCTIONAL ASSESSMENT: PAIN_FUNCTIONAL_ASSESSMENT: 0-10

## 2025-05-24 ASSESSMENT — PAIN DESCRIPTION - DESCRIPTORS: DESCRIPTORS: ACHING

## 2025-05-24 ASSESSMENT — PAIN SCALES - GENERAL: PAINLEVEL_OUTOF10: 8

## 2025-05-24 NOTE — ED PROVIDER NOTES
[facility]  EMERGENCY DEPARTMENT ENCOUNTER       Pt Name: Ashley Duke  MRN: [id number]  Birthdate 1990  Date of evaluation: 5/24/2025  PCP: Marcos Bustamante MD  Note Started: 5:29 PM 5/24/25     CHIEF COMPLAINT       Chief Complaint   Patient presents with    Hematuria    Tailbone Pain        HISTORY OF PRESENT ILLNESS: 1 or more elements      History From: Patient  HPI Limitations: None  Chronic Conditions: anemia  Social Determinants affecting Dx or Tx: none      Ashley Duke is a 35 y.o. female who presents to ED c/o coccyx pain and concern for UTI.  Patient states 6 days ago she slipped walking down her carpeted steps and fell onto her buttocks and slid down a few steps.  She states she continues have pain at her coccyx and wants to know if it is broken.  Her job involves a lot of driving and she has noticed that it is still painful.  She denies other back pain, head injury, extremity numbness or weakness, saddle anesthesia, any other injuries or complaints.  She also adds that she has been using NuvaRing for years, took it out for her normal menstrual cycle on 5/3/2025, had what she felt was a normal menstrual cycle but did not put a new NuvaRing because she wanted to take a break from birth control but continued to have spotting for many days and put the NuvaRing back in.  She states she has had some suprapubic cramping and is concerned about possible UTI.  She had normal Pap smear and STD screening 4 months ago.  She was sexually active about 2 months ago but has low concern for STD.  She denies other abnormal vaginal discharge and any other symptoms or complaints.     Nursing Notes were all reviewed and agreed with or any disagreements were addressed in the HPI.    PAST HISTORY     Past Medical History:  Past Medical History:   Diagnosis Date    Abnormal Papanicolaou smear of cervix     Anemia     Ovarian cyst     Renal calculi 8/26/2019       Past Surgical  sacrum show no acute abnormalities, consistent with coccyx contusion and she is comfortable with conservative OTC medications for symptomatic relief but should be self-limited and resolve after a couple weeks.  I discussed each of these tests and considerations with the patient. They agree with the plan of discharge.      FINAL IMPRESSION     1. Acute cystitis with hematuria    2. BV (bacterial vaginosis)    3. Coccyx contusion, initial encounter            DISPOSITION/PLAN   DISPOSITION Decision To Discharge 05/24/2025 04:47:57 PM   DISPOSITION CONDITION Stable                PATIENT REFERRED TO:  Marcos Bustamante MD  [address] [place] [id number] [phone]      As needed    [facility]  [address]   [place] [address]  [phone]    As needed, If symptoms worsen         DISCHARGE MEDICATIONS:     Medication List        START taking these medications      fluconazole 150 MG tablet  Commonly known as: DIFLUCAN  Take 1 tablet by mouth once for 1 dose     metroNIDAZOLE 500 MG tablet  Commonly known as: FLAGYL  Take 1 tablet by mouth 2 times daily for 7 days     nitrofurantoin (macrocrystal-monohydrate) 100 MG capsule  Commonly known as: Macrobid  Take 1 capsule by mouth 2 times daily for 7 days            ASK your doctor about these medications      dicyclomine 20 MG tablet  Commonly known as: BENTYL  Take 1 tablet by mouth 4 times daily as needed (Abdominal pain)     ondansetron 4 MG disintegrating tablet  Commonly known as: ZOFRAN-ODT  Take 1 tablet by mouth 3 times daily as needed for Nausea or Vomiting               Where to Get Your Medications        These medications were sent to [facility]/pharmacy #[id number] - [place], [place] - [address] [facility] - P [phone] - F [phone]  [address] [place] [id number]      Phone: [phone]   fluconazole 150 MG tablet  metroNIDAZOLE 500 MG tablet  nitrofurantoin (macrocrystal-monohydrate) 100 MG

## 2025-05-24 NOTE — ED TRIAGE NOTES
Patient reports she fell down steps about a week ago and her tail bone still hurts.   States she is not sure if she has a uti or just end of her period has some blood when she wiped

## 2025-05-28 LAB
C TRACH RRNA SPEC QL NAA+PROBE: NEGATIVE
N GONORRHOEA RRNA SPEC QL NAA+PROBE: NEGATIVE
SPECIMEN SOURCE: NORMAL